# Patient Record
(demographics unavailable — no encounter records)

---

## 2025-02-07 NOTE — ASSESSMENT
[FreeTextEntry1] : 2/7/25: R DMITRY (anterior) 03/2024 by ortho in [place]; mod L hip OA; lumbar DDD and radic. She likely has a combination of lumbar radic symptoms and numbness from the R DMITRY. Discussed anti-inflammatories, PT/HEP, IA hip CSI, and briefly DMITRY. Would recommend she have her back evaluated and have MRI L-spine done when she is back in [place]. f/up prn

## 2025-02-07 NOTE — DISCUSSION/SUMMARY
[de-identified] : The patient was advised of the diagnosis.  The natural history of the pathology was explained in full to the patient in layman's terms. All questions were answered.  The risks and benefits of surgical and non-surgical treatment alternatives were explained in full to the patient.  The natural progression of Osteoarthritis was explained to the patient.  We discussed the possible treatment options from conservative to operative.  These included NSAIDS, Glucosamine and Chondroitin sulfate, and Physical Therapy as well different types of injections.  We also discussed that at some point they may progress to needing a DMITRY.  Information and pamphlets were given when appropriate.  Progress Note completed by Germaine Gomez PA-C * Dr. Campos -- The documentation recorded in this note accurately reflects the decisions made by me during this visit.  I interviewed and examined the patient personally and oversaw all medical decisions.

## 2025-02-07 NOTE — HISTORY OF PRESENT ILLNESS
[Dull/Aching] : dull/aching [Radiating] : radiating [Throbbing] : throbbing [de-identified] : 2/7/25: 67yo F presents for eval of bilateral hips. Hx of R DMITRY (anterior) by ortho in [place] 03/2024. She is complaining of right anterior thigh numbness since the surgery. Also notes numbness in her first 3 toes. She is also complaining of left hip/groin and buttock pain. Admits to pain radiating down the left leg to the foot with N/T. Notes hx of herniated lumbar discs. Admits to increasing pain and difficulty with prolonged walking/standing, startup, and stairs.  [FreeTextEntry5] : R hip hx of replacement sx. pain worse walking, standing, activity. has tried ice, nsaids. pain radiates into the groin and into the glute

## 2025-02-07 NOTE — IMAGING
[Bilateral] : hip with pelvis bilaterally [Disc space narrowing] : Disc space narrowing [de-identified] : RIGHT HIP + mild Groin tenderness (-) Greater troch bursa tenderness numbness over anterior thigh + Impingement Good ROM Pain in groin with flexion NVI   LEFT HIP + Groin tenderness (-) Greater troch bursa tenderness + Buttock tenderness Limited internal rotation Pain in groin with flexion, internal rotation NVI  Mildly Antalgic gait with cane [FreeTextEntry9] : R DMITRY components well-fixed Mod L hip OA (Ton[place] 2)